# Patient Record
(demographics unavailable — no encounter records)

---

## 2025-01-22 NOTE — ASSESSMENT
[FreeTextEntry1] : The patient has developed a keloid on her left earlobe, likely as a result of a previous ear piercing. This diagnosis is based on the characteristic appearance of the lesion and its development following trauma to the area (ear piercing). The absence of keloid formation at other surgical sites suggests that the patient may have a localized predisposition to keloid formation in the ear region.  -Procedure  was explained in great detail. Risk and benefits were explained to the patient. Risks including bleeding, infection, wound breakdown, injury to adjacent structures revision surgery. Pt understands risk and would like to proceed with surgery. - Surgical excision of the keloid under general anesthesia in a hospital setting - Intraoperative steroid injection to reduce the risk of recurrence - Postoperative follow-up at 1 week and 2 weeks - Patient education on avoiding future piercings in the affected ear - Pain management with Tylenol and ibuprofen as needed - Instructions for postoperative care, including sleeping in a semi-upright position on the first night to reduce swelling - Restriction of heavy lifting and strenuous exercise for the first week post-surgery - Avoidance of wearing earrings in the affected ear for at least two months post-surgery - Scheduling of the surgical procedure to be arranged by the office staff

## 2025-01-22 NOTE — PHYSICAL EXAM
[de-identified] :  No acute distress, AOx3 [de-identified] :  EOMI, visual acuity intact, no diplopia [de-identified] : Non labored breathing, no acute distress or SOB noted [de-identified] :  Soft, non tender, no masses, no hernia observed [de-identified] : left ear: .5 cm x .2 cm keloid in ear lobe through and through

## 2025-01-22 NOTE — HISTORY OF PRESENT ILLNESS
[FreeTextEntry1] : Sue Grove, a female patient, presents with a lump in her left earlobe that developed following a second ear piercing performed 2-3 years ago. The patient reports experiencing itching and pain when wearing earrings in the affected area. Currently, she is unable to insert anything into the piercing hole, although the piercing itself is still present. The patient denies any drainage from the site and reports that there were no signs of infection when the piercing was initially done. She was referred to the plastic surgeon by her primary care physician, Dr. Nohemi Carcamo, after initially being considered for a general surgery consultation.